# Patient Record
Sex: FEMALE | Race: BLACK OR AFRICAN AMERICAN | NOT HISPANIC OR LATINO | Employment: UNEMPLOYED | URBAN - METROPOLITAN AREA
[De-identification: names, ages, dates, MRNs, and addresses within clinical notes are randomized per-mention and may not be internally consistent; named-entity substitution may affect disease eponyms.]

---

## 2021-03-15 DIAGNOSIS — Z23 NEED FOR VACCINATION: ICD-10-CM

## 2022-08-01 ENCOUNTER — TELEPHONE (OUTPATIENT)
Dept: SCHEDULING | Facility: IMAGING CENTER | Age: 62
End: 2022-08-01
Payer: MEDICARE

## 2022-08-08 ENCOUNTER — OFFICE VISIT (OUTPATIENT)
Dept: INTERNAL MEDICINE | Facility: OTHER | Age: 62
End: 2022-08-08
Payer: MEDICARE

## 2022-08-08 VITALS
WEIGHT: 293 LBS | BODY MASS INDEX: 50.02 KG/M2 | DIASTOLIC BLOOD PRESSURE: 79 MMHG | OXYGEN SATURATION: 94 % | HEIGHT: 64 IN | HEART RATE: 63 BPM | TEMPERATURE: 97.2 F | SYSTOLIC BLOOD PRESSURE: 133 MMHG

## 2022-08-08 DIAGNOSIS — N18.30 STAGE 3 CHRONIC KIDNEY DISEASE, UNSPECIFIED WHETHER STAGE 3A OR 3B CKD: ICD-10-CM

## 2022-08-08 DIAGNOSIS — E66.01 CLASS 3 SEVERE OBESITY WITH SERIOUS COMORBIDITY AND BODY MASS INDEX (BMI) OF 50.0 TO 59.9 IN ADULT, UNSPECIFIED OBESITY TYPE (HCC): ICD-10-CM

## 2022-08-08 DIAGNOSIS — M15.9 OSTEOARTHRITIS OF MULTIPLE JOINTS, UNSPECIFIED OSTEOARTHRITIS TYPE: ICD-10-CM

## 2022-08-08 DIAGNOSIS — M10.9 GOUT, UNSPECIFIED CAUSE, UNSPECIFIED CHRONICITY, UNSPECIFIED SITE: ICD-10-CM

## 2022-08-08 DIAGNOSIS — M19.90 OSTEOARTHRITIS, UNSPECIFIED OSTEOARTHRITIS TYPE, UNSPECIFIED SITE: ICD-10-CM

## 2022-08-08 DIAGNOSIS — E03.9 HYPOTHYROIDISM, UNSPECIFIED TYPE: ICD-10-CM

## 2022-08-08 DIAGNOSIS — I10 ESSENTIAL HYPERTENSION, BENIGN: ICD-10-CM

## 2022-08-08 DIAGNOSIS — Z00.00 ROUTINE ADULT HEALTH MAINTENANCE: ICD-10-CM

## 2022-08-08 DIAGNOSIS — F32.A DEPRESSION, UNSPECIFIED DEPRESSION TYPE: ICD-10-CM

## 2022-08-08 DIAGNOSIS — E11.69 TYPE 2 DIABETES MELLITUS WITH OTHER SPECIFIED COMPLICATION, UNSPECIFIED WHETHER LONG TERM INSULIN USE (HCC): ICD-10-CM

## 2022-08-08 PROBLEM — N18.9 CHRONIC KIDNEY DISEASE: Status: ACTIVE | Noted: 2022-08-08

## 2022-08-08 PROBLEM — E66.9 OBESITY: Status: ACTIVE | Noted: 2022-08-08

## 2022-08-08 PROBLEM — E11.9 T2DM (TYPE 2 DIABETES MELLITUS) (HCC): Status: ACTIVE | Noted: 2022-08-08

## 2022-08-08 PROCEDURE — 99204 OFFICE O/P NEW MOD 45 MIN: CPT | Mod: GC | Performed by: INTERNAL MEDICINE

## 2022-08-08 RX ORDER — ALLOPURINOL 100 MG/1
1 TABLET ORAL
COMMUNITY
Start: 2022-06-30 | End: 2022-08-08

## 2022-08-08 RX ORDER — GABAPENTIN 400 MG/1
400 CAPSULE ORAL 3 TIMES DAILY
COMMUNITY
End: 2022-08-08

## 2022-08-08 RX ORDER — ASPIRIN 81 MG/1
81 TABLET ORAL DAILY
COMMUNITY

## 2022-08-08 RX ORDER — CYCLOBENZAPRINE HCL 10 MG
10 TABLET ORAL 3 TIMES DAILY PRN
COMMUNITY
End: 2022-08-08

## 2022-08-08 RX ORDER — ACETAMINOPHEN 500 MG
1000 TABLET ORAL 3 TIMES DAILY PRN
COMMUNITY
Start: 2022-08-08

## 2022-08-08 RX ORDER — CARVEDILOL 25 MG/1
25 TABLET ORAL 2 TIMES DAILY WITH MEALS
Qty: 60 TABLET | Refills: 11 | Status: SHIPPED | OUTPATIENT
Start: 2022-08-08

## 2022-08-08 RX ORDER — PIOGLITAZONEHYDROCHLORIDE 15 MG/1
15 TABLET ORAL DAILY
COMMUNITY

## 2022-08-08 RX ORDER — GABAPENTIN 400 MG/1
400 CAPSULE ORAL 3 TIMES DAILY
Qty: 90 CAPSULE | Refills: 3 | Status: SHIPPED | OUTPATIENT
Start: 2022-08-08 | End: 2022-08-08

## 2022-08-08 RX ORDER — ALLOPURINOL 100 MG/1
100 TABLET ORAL DAILY
COMMUNITY

## 2022-08-08 RX ORDER — CYCLOBENZAPRINE HCL 10 MG
10 TABLET ORAL 2 TIMES DAILY PRN
Qty: 60 TABLET | Refills: 0 | Status: SHIPPED | OUTPATIENT
Start: 2022-08-08 | End: 2022-09-21

## 2022-08-08 RX ORDER — GABAPENTIN 400 MG/1
400 CAPSULE ORAL 3 TIMES DAILY
Qty: 90 CAPSULE | Refills: 3 | Status: SHIPPED | OUTPATIENT
Start: 2022-08-08

## 2022-08-08 RX ORDER — CYCLOBENZAPRINE HCL 10 MG
10 TABLET ORAL 2 TIMES DAILY PRN
Qty: 60 TABLET | Refills: 0 | Status: SHIPPED | OUTPATIENT
Start: 2022-08-08 | End: 2022-08-08

## 2022-08-08 ASSESSMENT — ENCOUNTER SYMPTOMS
SORE THROAT: 0
BLURRED VISION: 0
ABDOMINAL PAIN: 0
HEARTBURN: 0
HEADACHES: 0
LOSS OF CONSCIOUSNESS: 0
DIAPHORESIS: 0
COUGH: 0
DIARRHEA: 0
NAUSEA: 0
PALPITATIONS: 0
WEIGHT LOSS: 0
PND: 0
DIZZINESS: 0
CHILLS: 0
CONSTIPATION: 0
WHEEZING: 0
SHORTNESS OF BREATH: 0
VOMITING: 0
DOUBLE VISION: 0
SEIZURES: 0
FEVER: 0
WEAKNESS: 0

## 2022-08-08 ASSESSMENT — PATIENT HEALTH QUESTIONNAIRE - PHQ9: CLINICAL INTERPRETATION OF PHQ2 SCORE: 0

## 2022-08-08 NOTE — ASSESSMENT & PLAN NOTE
-Last HgbA1C available from 9 years prior, was 7  -Referred for outpatient Lab work including HgbA1c to be reviewed and discussed upon subsequent visit  -Lifestyle modification discussed at length regarding exercise and diet; encouragement provided; dietician referral

## 2022-08-08 NOTE — ASSESSMENT & PLAN NOTE
-Prior PCP referred to pain management to be evaluated for cortisone injections to bilateral knees in the context of chronic OA; patient not able to make appointment in [place] prior to moving back to [place]  -Status post RIGHT partial knee replacement (6/2014) and LEFT knee stem cell therapy injection (8/2021)  -LEFT knee more discomfort than RIGHT knee; also with RIGHT hip discomfort  -XRAYs recently done by prior PCP, pending procurement of her documentation  -Weightbearing; able to ambulate independently  -Referred to outpatient Pain Management  -Referred to outpatient Physical Therapy  -Continue with Tylenol 1000mg TID PRN  -Start with topical Voltaren (Diclofenac) Gel

## 2022-08-08 NOTE — PATIENT INSTRUCTIONS
-Please sign release of medical information form to be forwarded to our clinic  -Please follow up with your outpatient lab work  -Please follow up with your outpatient Pain Management referral  -Please continue to take Tylenol for knee pain  -Please follow up with your outpatient Dietician referral  -Please follow up with your outpatient Physical Therapy referral  -Please follow up with our outpatient clinic for your wellness exam in January

## 2022-08-08 NOTE — ASSESSMENT & PLAN NOTE
-Patient signing release of medical information to consolidate history from prior PCP  -Last Colonoscopy 2021 - was instructed to repeat colonoscopy in 10 years as per patient  -Last Mammogram 2 years ago         -Revisit Mammogram Screen at next visit  -Referred for outpatient Lab work (CBC, CMP, Lipid Profile, TSH with Reflex, HgbA1C, Microalbumin to Cr ratio)         -Discuss results at next visit

## 2022-08-08 NOTE — PROGRESS NOTES
Established Patient    Chief Complaint   Patient presents with   • Establish Care     NU2U   • Medication Refill   • Referral Needed     Weight loss       HISTORY OF PRESENT ILLNESS:     Amarilis Jones is our 61 year old Female patient with a Past Medical History significant for Hypertension, Hypothyroidism, Chronic Kidney Disease (Stage 3, 2/2 HTN), Overweight, LATA, GERD, Type 2 Diabetes Mellitus, Neuropathy who presents to clinic today to establish care after recently moving to [place] from [place] 2 weeks ago.     Her last lab work was from 4/5/2022; at that time her CBC was WNL (Hgb 11.5); Renal function panel showed a Creatinine of 1.48, eGFR of 43, BUN of 15, Na 137, K 5.1, Cl 102, Phosphate 3.5, Albumin 3.9.    She complains of bilateral pain in her knees, as well as discomfort in her right hip which she endorses was worked up with her prior provider including recent XRAY imaging and found to have osteoarthritis. She had a partial RIGHT knee replacement (6/2014), and underwent Stem Cell injection to her LEFT knee (8/2021). Her prior PCP was Dr. Gorman. Patient mentions she was previously referred to Pain Management in [place] to be evaluated for cortisol injections in both knee, but was not able to follow up prior to moving back to [place]. She was prescribed Meloxecam for the pain but discontinued the medication due to chest pain and shortness of breath side effects.    Patient has a BMI of 52.39, diagnosed with obesity, with is likely contributing to her picture of osteoarthritis. She has considered undergoing bariatric surgery for a gastric sleeve. Lifestyle modification regarding diet and exercise was discussed at length and provided encouragement to achieve small goals. To reevaluate further follow up with gastroenterology, and possibly consider starting on semaglutide on subsequent clinic follow up.    No past medical history on file.    Patient Active Problem List    Diagnosis Date Noted   •  Hypothyroid 08/08/2022   • Chronic kidney disease 08/08/2022   • T2DM (type 2 diabetes mellitus) ([facility]) 08/08/2022   • Depression 08/08/2022   • Gout 08/08/2022   • Routine adult health maintenance 08/08/2022   • Osteoarthritis 08/08/2022   • Obesity 08/08/2022   • HTN (hypertension) 10/09/2012   • GERD (gastroesophageal reflux disease) 10/09/2012   • Overweight 10/09/2012       Allergies:Patient has no known allergies.    Current Outpatient Medications   Medication Sig Dispense Refill   • allopurinol (ZYLOPRIM) 100 MG Tab Take 100 mg by mouth every day.     • ascorbic acid (VITAMIN C) 1000 MG tablet Take 1,000 mg by mouth every day.     • aspirin 81 MG EC tablet Take 81 mg by mouth every day.     • carvedilol (COREG) 25 MG Tab Take 1 Tablet by mouth 2 times a day with meals. 60 Tablet 11   • pioglitazone (ACTOS) 15 MG Tab Take 15 mg by mouth every day.     • gabapentin (NEURONTIN) 400 MG Cap Take 1 Capsule by mouth 3 times a day. 90 Capsule 3   • cyclobenzaprine (FLEXERIL) 10 mg Tab Take 1 Tablet by mouth 2 times a day as needed for Moderate Pain. 60 Tablet 0   • diclofenac sodium (VOLTAREN) 1 % Gel Apply 2 g topically 4 times a day as needed (knee pain). 350 g 3   • acetaminophen (TYLENOL) 500 MG Tab Take 2 Tablets by mouth 3 times a day as needed for Mild Pain.     • spironolactone (ALDACTONE) 50 MG TABS TAKE ONE TABLET BY MOUTH EVERY DAY 30 Each 8   • Calcium Carbonate (CALCIUM 600 PO) Take  by mouth 2 Times a Day.       • magnesium oxide (MAG-OX) 400 MG TABS Take 400 mg by mouth every day.       • Cyanocobalamin (VITAMIN B-12) 2500 MCG SUBL Place  under tongue.       • levothyroxine (SYNTHROID) 100 MCG TABS Take 100 mcg by mouth every day.       • losartan (COZAAR) 100 MG TABS Take 100 mg by mouth every day.       • duloxetine (CYMBALTA) 60 MG CPEP Take 60 mg by mouth every day.       • docosahexanoic acid (OMEGA 3 FA) 1000 MG Cap Take 1,000 mg by mouth 2 times a day.       • vitamin D (VITAMIND D3) 1000 UNIT  Tab Take  by mouth.     • amlodipine (NORVASC) 5 MG TABS TAKE ONE TABLET BY MOUTH EVERY DAY (Patient not taking: Reported on 8/8/2022) 30 Tab 6   • chlorthalidone (HYGROTON) 25 MG TABS TAKE ONE TABLET BY MOUTH EVERY DAY (Patient not taking: Reported on 8/8/2022) 30 Tab 11   • VALERIAN ROOT PO Take 400 mg by mouth every bedtime.   (Patient not taking: Reported on 8/8/2022)     • Cholecalciferol 1000 UNIT Cap Take  by mouth.   (Patient not taking: Reported on 8/8/2022)     • Docusate Calcium (STOOL SOFTENER PO) Take  by mouth.   (Patient not taking: Reported on 8/8/2022)     • omeprazole (PRILOSEC) 20 MG CPDR Take 20 mg by mouth every day.   (Patient not taking: Reported on 8/8/2022)       No current facility-administered medications for this visit.       Social History     Tobacco Use   • Smoking status: Never Smoker   • Smokeless tobacco: Never Used   Vaping Use   • Vaping Use: Never used   Substance Use Topics   • Alcohol use: No   • Drug use: No       Family History   Problem Relation Age of Onset   • Hypertension Mother    • Hypertension Father          Review of Systems   Review of Systems   Constitutional: Negative for chills, diaphoresis, fever, malaise/fatigue and weight loss.   HENT: Negative for ear discharge, ear pain, hearing loss, sore throat and tinnitus.    Eyes: Negative for blurred vision and double vision.   Respiratory: Negative for cough, shortness of breath and wheezing.    Cardiovascular: Negative for chest pain, palpitations, leg swelling and PND.   Gastrointestinal: Negative for abdominal pain, constipation, diarrhea, heartburn, nausea and vomiting.   Genitourinary: Negative for dysuria, frequency and urgency.   Musculoskeletal: Positive for joint pain.        Bilateral knee pain and RIGHT hip pain   Skin: Negative for itching and rash.   Neurological: Negative for dizziness, seizures, loss of consciousness, weakness and headaches.       Exam:  /79 (BP Location: Left arm, Patient  "Position: Sitting, BP Cuff Size: Adult)   Pulse 63   Temp 36.2 °C (97.2 °F) (Temporal)   Ht 1.626 m (5' 4\")   Wt (!) 138 kg (305 lb 3.2 oz)   SpO2 94%  Body mass index is 52.39 kg/m².    Constitutional:  Not in acute distress, well appearing, obese  HEENT:   Normocephalic, atraumatic.  Cardiovascular: S1, S2; Regular rate and rhythm; No murmurs/rubs/gallops.  Lungs:   Clear to auscultation bilaterally; No wheezes/rhales/rhonchi; No respiratory distress.  Abdomen: Soft, nondistended, not tender to palpation; no guarding no rigidity; no masses.  Extremities:  Superficial scar over RIGHT knee status post partial knee replacement; no tenderness to palpation bilateral knees or RIGHT hip; mild discomfort to RIGHT hip and LEFT > RIGHT knee upon standing  Skin:  Warm and dry.  No visible rashes.  Neurologic: Alert Awake & Oriented x 3, CN II-XII grossly intact; strength and sensation grossly intact.  No focal deficits noted.  Psychiatric:  Affect normal, mood normal, judgment normal.    Assessment/Plan:   61 year old Female patient with a Past Medical History significant for Hypertension, Hypothyroidism, Chronic Kidney Disease (Stage 3, 2/2 HTN), Overweight, LATA, GERD, Type 2 Diabetes Mellitus, Neuropathy who presents to clinic today to establish care after recently moving to [place] from [place] 2 weeks ago. She also complains of bilateral knee pain; considering following up with bariatric surgery for osteoarthritis bilateral knees and RIGHT hip.    Routine adult health maintenance  -Patient signing release of medical information to consolidate history from prior PCP  -Last Colonoscopy 2021 - was instructed to repeat colonoscopy in 10 years as per patient  -Last Mammogram 2 years ago         -Revisit Mammogram Screen at next visit  -Referred for outpatient Lab work (CBC, CMP, Lipid Profile, TSH with Reflex, HgbA1C, Microalbumin to Cr ratio)         -Discuss results at next visit    Osteoarthritis  -Prior PCP referred " to pain management to be evaluated for cortisone injections to bilateral knees in the context of chronic OA; patient not able to make appointment in [place] prior to moving back to [place]  -Status post RIGHT partial knee replacement (6/2014) and LEFT knee stem cell therapy injection (8/2021)  -LEFT knee more discomfort than RIGHT knee; also with RIGHT hip discomfort  -XRAYs recently done by prior PCP, pending procurement of her documentation  -Weightbearing; able to ambulate independently  -Referred to outpatient Pain Management  -Referred to outpatient Physical Therapy  -Continue with Tylenol 1000mg TID PRN  -Start with topical Voltaren (Diclofenac) Gel    Obesity  -BMI 52.39  -Osteoarthritis most likely 2/2 to chronic obesity  -Patient considering evaluation by bariatric surgery for gastric sleeve procedure  -Referred to outpatient Dietician  -May consider Semaglutide at subsequent visit    T2DM (type 2 diabetes mellitus) (HCC)  -Last HgbA1C available from 9 years prior, was 7  -Referred for outpatient Lab work including HgbA1c to be reviewed and discussed upon subsequent visit  -Lifestyle modification discussed at length regarding exercise and diet; encouragement provided; dietician referral       All imaging results and lab results and consult notes are reviewed at this visit.  Followup: Return in about 5 months (around 1/4/2023).    Shmuel Sharma MD  PGY-2 Internal Medicine Resident

## 2022-08-08 NOTE — ASSESSMENT & PLAN NOTE
-BMI 52.39  -Osteoarthritis most likely 2/2 to chronic obesity  -Patient considering evaluation by bariatric surgery for gastric sleeve procedure  -Referred to outpatient Dietician  -May consider Semaglutide at subsequent visit

## 2022-08-11 ENCOUNTER — APPOINTMENT (OUTPATIENT)
Dept: LAB | Facility: MEDICAL CENTER | Age: 62
End: 2022-08-11
Payer: MEDICARE

## 2022-08-11 LAB — HBA1C MFR BLD: 5.9 % (ref 0–5.6)

## 2022-09-16 NOTE — TELEPHONE ENCOUNTER
Cyclobenzaprine Refill    Last seen: 8/8/22 by Dr. Sharma  Next appt: None    Was the patient seen in the last year in this department? Yes   Does patient have an active prescription for medications requested? No   Received Request Via: Pharmacy

## 2022-09-21 RX ORDER — CYCLOBENZAPRINE HCL 10 MG
TABLET ORAL
Qty: 60 TABLET | Refills: 0 | Status: SHIPPED | OUTPATIENT
Start: 2022-09-21

## 2022-09-22 NOTE — TELEPHONE ENCOUNTER
Discussed prescription with Dr. Luiz Chambers.  The patient was seen by Dr. Sharma and Dr. Zaragoza on August 8, when Dr. Zaragoza ordered Flexeril 10 mg twice daily as needed for muscle pain.  Given Dr. Zaragoza's approval of this medicine, I will refill for another 1 month supply.  The patient should return to clinic in 1 month to discuss this medicine and pain management given that he is taken all as needed doses consecutively.